# Patient Record
Sex: FEMALE | Race: BLACK OR AFRICAN AMERICAN | Employment: UNEMPLOYED | URBAN - METROPOLITAN AREA
[De-identification: names, ages, dates, MRNs, and addresses within clinical notes are randomized per-mention and may not be internally consistent; named-entity substitution may affect disease eponyms.]

---

## 2017-04-08 ENCOUNTER — HOSPITAL ENCOUNTER (OUTPATIENT)
Dept: MRI IMAGING | Age: 47
Discharge: HOME | End: 2017-04-08
Payer: COMMERCIAL

## 2017-04-08 DIAGNOSIS — M25.561 RIGHT KNEE PAIN, UNSPECIFIED CHRONICITY: ICD-10-CM

## 2017-04-08 PROCEDURE — 73721 MRI JNT OF LWR EXTRE W/O DYE: CPT

## 2018-02-16 ENCOUNTER — HOSPITAL ENCOUNTER (OUTPATIENT)
Dept: MAMMOGRAPHY | Age: 48
Discharge: HOME | End: 2018-02-18
Payer: COMMERCIAL

## 2018-02-16 DIAGNOSIS — Z12.31 VISIT FOR SCREENING MAMMOGRAM: ICD-10-CM

## 2018-02-16 PROCEDURE — 77067 SCR MAMMO BI INCL CAD: CPT

## 2018-03-01 ENCOUNTER — HOSPITAL ENCOUNTER (OUTPATIENT)
Dept: MAMMOGRAPHY | Age: 48
Discharge: HOME | End: 2018-03-03
Payer: COMMERCIAL

## 2018-03-01 DIAGNOSIS — Z01.419 VISIT FOR GYNECOLOGIC EXAMINATION: ICD-10-CM

## 2018-03-01 PROCEDURE — 77080 DXA BONE DENSITY AXIAL: CPT

## 2019-02-21 ENCOUNTER — HOSPITAL ENCOUNTER (OUTPATIENT)
Dept: MAMMOGRAPHY | Age: 49
Discharge: HOME | End: 2019-02-23
Payer: COMMERCIAL

## 2019-02-21 DIAGNOSIS — Z12.39 SCREENING FOR BREAST CANCER: ICD-10-CM

## 2019-02-21 PROCEDURE — 77067 SCR MAMMO BI INCL CAD: CPT

## 2019-02-22 LAB
ABSOLUTE IMMATURE GRANULOCYTE: 0 10*3/UL (ref 0–0.2)
ALBUMIN: 4.1 G/DL (ref 3.5–5.7)
ALP BLD-CCNC: 98 IU/L (ref 34–104)
ALT SERPL-CCNC: 15 IU/L (ref 7–52)
AST SERPL-CCNC: 20 IU/L (ref 13–39)
BASOPHILS ABSOLUTE: 0 10*3/UL (ref 0–0.2)
BASOPHILS RELATIVE PERCENT: 0 % (ref 0–1)
BILIRUB SERPL-MCNC: 0.3 MG/DL (ref 0.3–1)
BUN BLDV-MCNC: 19 MG/DL (ref 7–25)
CALCIUM SERPL-MCNC: 9.6 MG/DL (ref 8.6–10.3)
CHLORIDE BLD-SCNC: 104 MEQ/L (ref 98–107)
CO2: 27 MEQ/L (ref 21–31)
CREAT SERPL-MCNC: 0.95 MG/DL (ref 0.6–1.2)
EGFR AFRICAN AMERICAN: >60 ML/MIN/1.73SQ M
EGFR IF NONAFRICAN AMERICAN: >60 ML/MIN/1.73SQ M
EOSINOPHILS ABSOLUTE: 0 10*3/UL (ref 0–0.5)
EOSINOPHILS RELATIVE PERCENT: 1.2 % (ref 0–6)
GLUCOSE: 99 MG/DL (ref 70–100)
HCT VFR BLD CALC: 32.9 % (ref 36–45)
HEMOGLOBIN: 10.6 G/DL (ref 12–15)
IMMATURE GRANULOCYTES: 0 % (ref 0–1)
LYMPHOCYTES ABSOLUTE: 0.6 10*3/UL (ref 1.2–4)
LYMPHOCYTES RELATIVE PERCENT: 23.1 % (ref 20–45)
MCH RBC QN AUTO: 33.8 PG (ref 27–33)
MCHC RBC AUTO-ENTMCNC: 32.2 G/DL (ref 32–35)
MCV RBC AUTO: 104.8 FL (ref 82–98)
MONOCYTES ABSOLUTE: 0.4 10*3/UL (ref 0.1–1)
MONOCYTES RELATIVE PERCENT: 14.2 % (ref 5–12)
NEUTROPHILS ABSOLUTE: 1.6 10*3/UL (ref 1.6–7.6)
NEUTROPHILS RELATIVE PERCENT: 61.5 % (ref 40–72)
NUCLEATED RED BLOOD CELLS: 0 % (ref 0–0)
PDW BLD-RTO: 13.7 % (ref 11.5–15)
PLATELET # BLD: 223 10*3/UL (ref 150–400)
POTASSIUM SERPL-SCNC: 4.6 MEQ/L (ref 3.5–5.1)
PROTEIN TOTAL: 7.9 G/DL (ref 6–8.3)
RBC: 3.14 10*6/UL (ref 3.8–5)
SODIUM BLD-SCNC: 138 MEQ/L (ref 136–145)
WBC: 2.6 10*3/UL (ref 4–10.6)

## 2019-02-26 LAB
+IMM: ABNORMAL
IGA: 99 MG/DL (ref 60–413)
IGG: 1590 MG/DL (ref 591–1540)
IGM: 52 MG/DL (ref 54–285)
KAPPA FREE LIGHT CHAINS QNT: 1.41 MG/DL (ref 0.33–1.94)
KAPPA/LAMBDA RATIO: 0.39 RATIO (ref 0.26–1.65)
LAMBDA: 3.61 MG/DL (ref 0.57–2.63)

## 2019-03-27 ENCOUNTER — HOSPITAL ENCOUNTER (OUTPATIENT)
Dept: ULTRASOUND IMAGING | Age: 49
Discharge: HOME | End: 2019-03-29
Payer: COMMERCIAL

## 2019-03-27 DIAGNOSIS — N92.1 EXCESSIVE AND FREQUENT MENSTRUATION WITH IRREGULAR CYCLE: ICD-10-CM

## 2019-03-27 PROCEDURE — 76830 TRANSVAGINAL US NON-OB: CPT

## 2019-04-04 ENCOUNTER — APPOINTMENT (OUTPATIENT)
Dept: GENERAL RADIOLOGY | Age: 49
End: 2019-04-04
Payer: COMMERCIAL

## 2019-04-04 ENCOUNTER — HOSPITAL ENCOUNTER (EMERGENCY)
Age: 49
Discharge: HOME | End: 2019-04-04
Attending: EMERGENCY MEDICINE
Payer: COMMERCIAL

## 2019-04-04 ENCOUNTER — HOSPITAL ENCOUNTER (OUTPATIENT)
Dept: CT IMAGING | Age: 49
Discharge: HOME | End: 2019-04-06
Payer: COMMERCIAL

## 2019-04-04 VITALS
RESPIRATION RATE: 19 BRPM | HEIGHT: 72 IN | OXYGEN SATURATION: 100 % | TEMPERATURE: 98.4 F | HEART RATE: 65 BPM | SYSTOLIC BLOOD PRESSURE: 115 MMHG | BODY MASS INDEX: 39.68 KG/M2 | WEIGHT: 293 LBS | DIASTOLIC BLOOD PRESSURE: 66 MMHG

## 2019-04-04 VITALS
TEMPERATURE: 98.4 F | OXYGEN SATURATION: 99 % | RESPIRATION RATE: 16 BRPM | HEIGHT: 72 IN | SYSTOLIC BLOOD PRESSURE: 151 MMHG | BODY MASS INDEX: 39.68 KG/M2 | WEIGHT: 293 LBS | HEART RATE: 71 BPM | DIASTOLIC BLOOD PRESSURE: 85 MMHG

## 2019-04-04 DIAGNOSIS — R07.9 CHEST PAIN, UNSPECIFIED TYPE: Primary | ICD-10-CM

## 2019-04-04 LAB
ABSOLUTE EOS #: 0 K/UL (ref 0–0.4)
ABSOLUTE IMMATURE GRANULOCYTE: ABNORMAL K/UL (ref 0–0.3)
ABSOLUTE LYMPH #: 0.8 K/UL (ref 1–4.8)
ABSOLUTE MONO #: 0.3 K/UL (ref 0.2–0.8)
ABSOLUTE RETIC #: 0.04 M/UL (ref 0.02–0.1)
ANION GAP SERPL CALCULATED.3IONS-SCNC: 12 MMOL/L (ref 9–17)
BASOPHILS # BLD: 1 % (ref 0–2)
BASOPHILS ABSOLUTE: 0 K/UL (ref 0–0.2)
BUN BLDV-MCNC: 16 MG/DL (ref 6–20)
BUN/CREAT BLD: 18 (ref 9–20)
CALCIUM SERPL-MCNC: 9.6 MG/DL (ref 8.6–10.4)
CHLORIDE BLD-SCNC: 102 MMOL/L (ref 98–107)
CO2: 24 MMOL/L (ref 20–31)
CREAT SERPL-MCNC: 0.9 MG/DL (ref 0.5–0.9)
DIFFERENTIAL TYPE: ABNORMAL
EOSINOPHILS RELATIVE PERCENT: 1 % (ref 1–4)
GFR AFRICAN AMERICAN: >60 ML/MIN
GFR NON-AFRICAN AMERICAN: >60 ML/MIN
GFR SERPL CREATININE-BSD FRML MDRD: NORMAL ML/MIN/{1.73_M2}
GFR SERPL CREATININE-BSD FRML MDRD: NORMAL ML/MIN/{1.73_M2}
GLUCOSE BLD-MCNC: 95 MG/DL (ref 70–99)
HCT VFR BLD CALC: 33 % (ref 36–46)
HEMOGLOBIN: 11.2 G/DL (ref 12–16)
IMMATURE GRANULOCYTES: ABNORMAL %
IMMATURE RETIC FRACT: NORMAL %
INR BLD: 0.9
LYMPHOCYTES # BLD: 35 % (ref 24–44)
MCH RBC QN AUTO: 33.8 PG (ref 26–34)
MCHC RBC AUTO-ENTMCNC: 33.9 G/DL (ref 31–37)
MCV RBC AUTO: 99.6 FL (ref 80–100)
MONOCYTES # BLD: 11 % (ref 1–7)
NRBC AUTOMATED: ABNORMAL PER 100 WBC
PARTIAL THROMBOPLASTIN TIME: 20.9 SEC (ref 23–31)
PDW BLD-RTO: 14.8 % (ref 11.5–14.5)
PLATELET # BLD: 227 K/UL (ref 130–400)
PLATELET ESTIMATE: ABNORMAL
PMV BLD AUTO: 6.8 FL (ref 6–12)
POTASSIUM SERPL-SCNC: 4.7 MMOL/L (ref 3.7–5.3)
PROTHROMBIN TIME: 9.6 SEC (ref 9.7–11.6)
RBC # BLD: 3.31 M/UL (ref 4–5.2)
RBC # BLD: ABNORMAL 10*6/UL
RETIC %: 1.3 % (ref 0.5–2)
RETIC HEMOGLOBIN: NORMAL PG (ref 28.2–35.7)
SEG NEUTROPHILS: 52 % (ref 36–66)
SEGMENTED NEUTROPHILS ABSOLUTE COUNT: 1.2 K/UL (ref 1.8–7.7)
SODIUM BLD-SCNC: 138 MMOL/L (ref 135–144)
TROPONIN INTERP: NORMAL
TROPONIN INTERP: NORMAL
TROPONIN T: NORMAL NG/ML
TROPONIN T: NORMAL NG/ML
TROPONIN, HIGH SENSITIVITY: 6 NG/L (ref 0–14)
TROPONIN, HIGH SENSITIVITY: 7 NG/L (ref 0–14)
WBC # BLD: 2.4 K/UL (ref 3.5–11)
WBC # BLD: ABNORMAL 10*3/UL

## 2019-04-04 PROCEDURE — 99285 EMERGENCY DEPT VISIT HI MDM: CPT

## 2019-04-04 PROCEDURE — 85025 COMPLETE CBC W/AUTO DIFF WBC: CPT

## 2019-04-04 PROCEDURE — 6370000000 HC RX 637 (ALT 250 FOR IP): Performed by: EMERGENCY MEDICINE

## 2019-04-04 PROCEDURE — 36415 COLL VENOUS BLD VENIPUNCTURE: CPT

## 2019-04-04 PROCEDURE — 85730 THROMBOPLASTIN TIME PARTIAL: CPT

## 2019-04-04 PROCEDURE — 2580000003 HC RX 258: Performed by: RADIOLOGY

## 2019-04-04 PROCEDURE — 80048 BASIC METABOLIC PNL TOTAL CA: CPT

## 2019-04-04 PROCEDURE — 85610 PROTHROMBIN TIME: CPT

## 2019-04-04 PROCEDURE — 84484 ASSAY OF TROPONIN QUANT: CPT

## 2019-04-04 PROCEDURE — 71045 X-RAY EXAM CHEST 1 VIEW: CPT

## 2019-04-04 PROCEDURE — 93005 ELECTROCARDIOGRAM TRACING: CPT

## 2019-04-04 PROCEDURE — 85045 AUTOMATED RETICULOCYTE COUNT: CPT

## 2019-04-04 RX ORDER — SPIRONOLACTONE 100 MG/1
200 TABLET, FILM COATED ORAL EVERY MORNING
COMMUNITY

## 2019-04-04 RX ORDER — SPIRONOLACTONE 100 MG/1
100 TABLET, FILM COATED ORAL DAILY
COMMUNITY
Start: 2019-03-06 | End: 2019-04-04

## 2019-04-04 RX ORDER — ARIPIPRAZOLE 15 MG/1
15 TABLET ORAL DAILY
COMMUNITY
Start: 2019-03-02

## 2019-04-04 RX ORDER — SODIUM CHLORIDE 9 MG/ML
INJECTION, SOLUTION INTRAVENOUS CONTINUOUS
Status: DISCONTINUED | OUTPATIENT
Start: 2019-04-04 | End: 2019-04-07 | Stop reason: HOSPADM

## 2019-04-04 RX ORDER — FLUTICASONE PROPIONATE 50 MCG
1 SPRAY, SUSPENSION (ML) NASAL DAILY PRN
COMMUNITY

## 2019-04-04 RX ORDER — ASPIRIN 81 MG/1
324 TABLET, CHEWABLE ORAL ONCE
Status: COMPLETED | OUTPATIENT
Start: 2019-04-04 | End: 2019-04-04

## 2019-04-04 RX ORDER — OMEPRAZOLE 20 MG/1
20 CAPSULE, DELAYED RELEASE ORAL DAILY
COMMUNITY
Start: 2019-04-02

## 2019-04-04 RX ORDER — SODIUM CHLORIDE 0.9 % (FLUSH) 0.9 %
10 SYRINGE (ML) INJECTION PRN
Status: DISCONTINUED | OUTPATIENT
Start: 2019-04-04 | End: 2019-04-07 | Stop reason: HOSPADM

## 2019-04-04 RX ORDER — METOPROLOL SUCCINATE 100 MG/1
100 TABLET, EXTENDED RELEASE ORAL DAILY
COMMUNITY
Start: 2019-03-04

## 2019-04-04 RX ADMIN — SODIUM CHLORIDE: 9 INJECTION, SOLUTION INTRAVENOUS at 09:47

## 2019-04-04 RX ADMIN — ASPIRIN 81 MG 324 MG: 81 TABLET ORAL at 10:51

## 2019-04-04 ASSESSMENT — PAIN DESCRIPTION - ONSET: ONSET: ON-GOING

## 2019-04-04 ASSESSMENT — ENCOUNTER SYMPTOMS
BACK PAIN: 0
EYE DISCHARGE: 0
FACIAL SWELLING: 0
CHEST TIGHTNESS: 0
SHORTNESS OF BREATH: 0
ABDOMINAL DISTENTION: 0
EYE PAIN: 0
ABDOMINAL PAIN: 0

## 2019-04-04 ASSESSMENT — PAIN - FUNCTIONAL ASSESSMENT: PAIN_FUNCTIONAL_ASSESSMENT: 0-10

## 2019-04-04 ASSESSMENT — PAIN DESCRIPTION - PAIN TYPE: TYPE: ACUTE PAIN

## 2019-04-04 ASSESSMENT — PAIN DESCRIPTION - DESCRIPTORS
DESCRIPTORS: ACHING
DESCRIPTORS: PRESSURE

## 2019-04-04 ASSESSMENT — PAIN DESCRIPTION - LOCATION: LOCATION: CHEST

## 2019-04-04 ASSESSMENT — PAIN SCALES - GENERAL: PAINLEVEL_OUTOF10: 4

## 2019-04-04 ASSESSMENT — HEART SCORE: ECG: 0

## 2019-04-04 ASSESSMENT — PAIN DESCRIPTION - ORIENTATION: ORIENTATION: LEFT;ANTERIOR

## 2019-04-04 ASSESSMENT — PAIN DESCRIPTION - FREQUENCY: FREQUENCY: CONTINUOUS

## 2019-04-04 ASSESSMENT — PAIN DESCRIPTION - PROGRESSION: CLINICAL_PROGRESSION: NOT CHANGED

## 2019-04-04 NOTE — PROGRESS NOTES
Transitions of Care Pharmacy Service   Medication Review    The patient's list of current home medications has been reviewed. Source(s) of information: Patient/SS    Based on information provided by the above source(s), I have updated the patient's home med list as described below. Please review the ACTION REQUESTED BY PHYSICIAN section of this note for any discrepancies on current hospital orders. I changed or updated the following medications on the patient's home medication list:  Discontinued Cyanocobalamin (B-12) 1000 MCG SUBL (Pt stated she has not been taking. Last time she took this was months ago)     Added Fluticasone 50 mcg (on sure scripts, patient says she uses it PRN)     Adjusted   Cholecalciferol (Vitamin D) 2000 units to vitamin D3 5000 units daily per patient  Metformin 500 mg sig was adjusted to metformin 500 mg BID  Spironolactone 100 mg daily to spironolactone 200 mg daily in the morning per patient   Other Notes Allergies and reactions        PCNS - Headaches        Beta blockers - Patient says she does not know the reaction  but that family doctor listed it        Bactrim - Reaction unknown        Lisinopril - Facial swelling     Please feel free to call me with any questions about this encounter. Thank you. This note will be reviewed and co-signed by the Transitions of Care Pharmacist.    Yumiko Jeff, PharmD student  Transitions of Care Pharmacy Service  Phone:  [phone]  Fax: [phone]      Electronically signed by Yumiko Jeff on 4/4/2019 at 1:08 PM     Prior to Admission medications    Medication Sig Start Date End Date Taking?  Authorizing Provider   metFORMIN (GLUCOPHAGE) 500 MG tablet 500 mg 2 times daily (with meals)  3/6/19  Yes Historical Provider, MD   metoprolol succinate (TOPROL XL) 100 MG extended release tablet 100 mg daily  3/4/19  Yes Historical Provider, MD   ARIPiprazole (ABILIFY) 15 MG tablet 15 mg daily  3/2/19  Yes Historical Provider, MD omeprazole (PRILOSEC) 20 MG delayed release capsule 20 mg Daily  4/2/19  Yes Historical Provider, MD   spironolactone (ALDACTONE) 100 MG tablet Take 200 mg by mouth daily Indications: In the morning   Yes Historical Provider, MD   Cholecalciferol (VITAMIN D3) 5000 units TABS Take 1 tablet by mouth daily   Yes Historical Provider, MD   fluticasone (FLONASE) 50 MCG/ACT nasal spray 1 spray by Each Nare route daily as needed for Rhinitis   Yes Historical Provider, MD   Multiple Vitamins-Minerals (THERAPEUTIC MULTIVITAMIN-MINERALS) tablet Take 1 tablet by mouth daily. Yes Historical Provider, MD   Glucosamine-Chondroit-Vit C-Mn (GLUCOSAMINE CHONDR 1500 COMPLX) CAPS Take 1,500 mg by mouth daily    Yes Historical Provider, MD   albuterol (PROVENTIL HFA;VENTOLIN HFA) 108 (90 BASE) MCG/ACT inhaler Inhale 2 puffs into the lungs every 6 hours as needed for Wheezing.    Yes Historical Provider, MD

## 2019-04-04 NOTE — ED PROVIDER NOTES
EMERGENCY DEPARTMENT ENCOUNTER    Pt Name: No Mcneil  MRN: [id number]  [place] 1970  Date of evaluation: 19  CHIEF COMPLAINT       Chief Complaint   Patient presents with    Chest Pain     HISTORY OF PRESENT ILLNESS   HPI   Patient is a 74-year-old female with a history of hypertension, prediabetes who presented to the emergency department secondary to chest pain. Patient was scheduled to undergo an outpatient bone marrow biopsy when she complained of chest pain. Patient stated chest pain began yesterday while at rest just been constant since then she describes as a pressure sensation 4-10 on the pain score with radiation to her left shoulder. Patient denied any trauma or injury. She has not presented to the emerge from for care secondary to being stubborn. Patient has a history of anemia she was previously her menstrual cycle for 5 weeks underwent a pelvic ultrasound she has not followed up on the results. She denied hematuria or rectal bleeding or feelings that she was going to faint. Patient has a positive family history of cardiac disease father  of a heart attack later in life. She denied tobacco use. She had a stress test several years ago that was normal per her done for routine checkup. She's never had a heart cath. Patient had recent travel, surgery, and mobility. No family history of clotting disorder. REVIEW OF SYSTEMS     Review of Systems   Constitutional: Negative for chills, diaphoresis and fever. HENT: Negative for congestion, ear pain and facial swelling. Eyes: Negative for pain, discharge and visual disturbance. Respiratory: Negative for chest tightness and shortness of breath. Cardiovascular: Positive for chest pain. Negative for palpitations. Gastrointestinal: Negative for abdominal distention and abdominal pain. Genitourinary: Negative for difficulty urinating and flank pain. Musculoskeletal: Negative for back pain. Skin: Negative for wound. Neurological: Negative for dizziness, light-headedness and headaches. 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[facility]) 2003    Hiatal hernia     Leukopenia     Lupus     Monoclonal gammopathy     MVP (mitral valve prolapse)     Plantar fasciitis of left foot     Reye's syndrome     Seasonal allergic conjunctivitis     not sure what allergic to     SURGICAL HISTORY       Past Surgical History:   Procedure Laterality Date    FINGER SURGERY      left ring finger    FOOT SURGERY Left     KNEE ARTHROPLASTY Left     SHOULDER SURGERY Left     TOE SURGERY      right great    WISDOM TOOTH EXTRACTION       CURRENT MEDICATIONS       Discharge Medication List as of 4/4/2019  1:41 PM      CONTINUE these medications which have NOT CHANGED    Details   metFORMIN (GLUCOPHAGE) 500 MG tablet 500 mg 2 times daily (with meals) Historical Med      metoprolol succinate (TOPROL XL) 100 MG extended release tablet 100 mg daily Historical Med      ARIPiprazole (ABILIFY) 15 MG tablet 15 mg daily Historical Med      omeprazole (PRILOSEC) 20 MG delayed release capsule 20 mg Daily Historical Med      Multiple Vitamins-Minerals (THERAPEUTIC MULTIVITAMIN-MINERALS) tablet Take 1 tablet by mouth daily. Glucosamine-Chondroit-Vit C-Mn (GLUCOSAMINE CHONDR 1500 COMPLX) CAPS Take 1,500 mg by mouth daily Historical Med      spironolactone (ALDACTONE) 100 MG tablet Take 200 mg by mouth daily Indications: In the morningHistorical Med      Cholecalciferol (VITAMIN D3) 5000 units TABS Take 1 tablet by mouth dailyHistorical Med      fluticasone (FLONASE) 50 MCG/ACT nasal spray 1 spray by Each Nare route daily as needed for RhinitisHistorical Med      albuterol (PROVENTIL HFA;VENTOLIN HFA) 108 (90 BASE) MCG/ACT inhaler Inhale 2 puffs into the lungs every 6 hours as needed for Wheezing.            ALLERGIES is allergic to pcn [penicillins]; beta adrenergic blockers; lisinopril; and sulfamethoxazole-trimethoprim. FAMILY HISTORY     has no family status information on file. SOCIAL HISTORY       Social History     Tobacco Use    Smoking status: Never Smoker    Smokeless tobacco: Never Used   Substance Use Topics    Alcohol use: No    Drug use: No     PHYSICAL EXAM     INITIAL VITALS: /66   Pulse 65   Temp 98.4 °F (36.9 °C) (Oral)   Resp 19   Ht 6' (1.829 m)   Wt (!) 327 lb (148.3 kg)   LMP 08/19/2013   SpO2 100%   BMI 44.35 kg/m²    Physical Exam   Constitutional: She is oriented to person, place, and time. She appears well-developed and well-nourished. HENT:   Head: Normocephalic and atraumatic. Eyes: Pupils are equal, round, and reactive to light. EOM are normal.   Neck: Normal range of motion. Neck supple. Cardiovascular: Normal rate and regular rhythm. Pulmonary/Chest: Effort normal and breath sounds normal.   Abdominal: Soft. Bowel sounds are normal.   Musculoskeletal: Normal range of motion. Neurological: She is alert and oriented to person, place, and time. Nursing note and vitals reviewed. MEDICAL DECISION MAKING:   The patient was seen and examined. The patient's a 57-year-old female presented to the emergency department secondary to chest pain. Differential Diagnosis included but not limited to STEMI, pericarditis, anemia. EKG obtained sinus rhythm no acute ST wave depression elevation or findings consistent with STEMI. Patient received aspirin she was chest pain-free. Chest x-ray no infiltrate dissection or pneumothorax. Troponin ×2 negative. Patient discussed with Dr. Mckenzie [place], cardiology,  who agreed the patient can be discharged home with outpatient follow-up. Patient was amenable to this treatment plan. Discharged home with outpatient follow-up.          CRITICAL CARE:              NIH STROKE SCALE:            PROCEDURES:    Procedures    DIAGNOSTIC PM        Modesta Hernandez MD  Attending Emergency Physician                    Modesta Hernandez MD  93/46/05 2263

## 2019-04-04 NOTE — ED NOTES
Pt connected to cardiac monitor and 02 @ 2L per NC applied at this time.       Juan Rebolledo RN  04/04/19 [id number]

## 2019-04-04 NOTE — ED NOTES
Pt to room 24 via stretcher from Pre-op with c/o ongoing left sided CP, onset yesterday. Pt reports she was scheduled to have a bone marrow biopsy today, states \"My red blood cells are enlarged and I'm anemic\" and \"I was having a lot of heavy vaginal bleeding so that's why they're doing the biopsy\". Pt denies having any vaginal bleeding at this time. Pt reports CP does not radiate anywhere. Pt denies any aggravating or alleviating factors. Pt denies any dizziness, SOB, N/V, or sweats. Respirations even and non labored. Skin PWD, MMM. NSR on monitor without ectopy. NAD noted.       Gonzales Gibson RN  04/04/19 [id number]

## 2019-04-04 NOTE — PROGRESS NOTES
Pt c/o midsternum chest pain of a \"4\", Special procedures notified and pt taken to ER for evaluation

## 2019-04-04 NOTE — H&P (VIEW-ONLY)
[facility] Physical [facility]   [facility]    HISTORY AND PHYSICAL EXAMINATION            Date of Evaluation: 4/4/2019  Patient name:  Jenna Riley  MRN:   [id number]  YOB: 1970  PCP:    Chanel Gonsalves MD    History Obtained From:     Patient, medical records    History of Present Illness: This is Jenna Riley a 51 y.o. female who presents today for a bone marrow biopsy by Dr. Kimo Flores. The patient has a history of monoclonal gammopathy as well as anemia. Her labs  on 2/22/19 showed Hgb level 10.6, WBC 2.60 and platelets 952. Her IgG was 1590 and Lambda 3.61. She also has a history of Lupus as well as Guillaiin-[place] diagnosed in 2003. She currently is complaining of new onset midsternal chest pain pressure like pain that is a constant 4/10 for the past 24 hours. She states she does have MVP but has never had chest pain before in the past.  She denies any other cardiac associated symptoms such as radiation, dyspnea, palpitations, light headedness or diaphoresis. Interventional radiology called regarding this problem and told to have patient go to the ED. Patient notified and she verbalizes understanding and is agreeable to go to the ED.       Past Medical History:     Past Medical History:   Diagnosis Date    Anemia     Asthma     Bone spur     feet    Bronchitis     Guillain-[place] ([address] 2003    Guillain-[place] ([facility] [address] 2003    Hiatal hernia     Leukopenia     Lupus (HCC)     Monoclonal gammopathy     MVP (mitral valve prolapse)     Plantar fasciitis of left foot     Reye's syndrome     Seasonal allergic conjunctivitis     not sure what allergic to        Past Surgical History:     Past Surgical History:   Procedure Laterality Date    FINGER SURGERY      left ring finger    KNEE ARTHROPLASTY Left     SHOULDER SURGERY Left     TOE SURGERY      right great    WISDOM TOOTH EXTRACTION          Medications Prior to Admission:     Prior to Admission medications    Medication Sig Start Date End Date Taking? Authorizing Provider   Cyanocobalamin (B-12) 1000 MCG SUBL Place 1 tablet under the tongue daily 7/9/15   Lit Meza MD   oxyCODONE-acetaminophen [facility]) 5-325 MG TABS Take 1-2 tablets by mouth every 6 hours as needed 7/9/15   Lit Meza MD   famotidine (PEPCID) 40 MG TABS Take 1 tablet by mouth daily 7/9/15   Lit Meza MD   Multiple Vitamins-Minerals (THERAPEUTIC MULTIVITAMIN-MINERALS) tablet Take 1 tablet by mouth daily. Historical Provider, MD   Cholecalciferol (VITAMIN D) 2000 UNITS CAPS capsule Take 2,000 Units by mouth. Historical Provider, MD   Glucosamine-Chondroit-Vit C-Mn (GLUCOSAMINE CHONDR 1500 COMPLX) CAPS Take 1,500 mg by mouth. Historical Provider, MD   albuterol (PROVENTIL HFA;VENTOLIN HFA) 108 (90 BASE) MCG/ACT inhaler Inhale 2 puffs into the lungs every 6 hours as needed for Wheezing. Historical Provider, MD        Allergies:     Pcn [penicillins]; Lisinopril; and Sulfamethoxazole-trimethoprim    Social History:     Tobacco:    reports that she has never smoked. She does not have any smokeless tobacco history on file. Alcohol:      reports that she does not drink alcohol. Drug Use:  reports that she does not use drugs. Family History:     No family history on file. Review of Systems:     Positive and Negative as described in HPI. CONSTITUTIONAL:  negative for fevers, chills, sweats, fatigue, weight loss  HEENT:  negative for vision, hearing changes, runny nose, throat pain  RESPIRATORY:  negative for shortness of breath, cough, congestion, wheezing. CARDIOVASCULAR: New onset mid sternal chest pain pressure like pain that is a constant 4/10 for the past 24 hours. She states she does have MVP but has never had chest pain before in the past.  She denies any other cardiac associated symptoms such as radiation, dyspnea, palpitations, light headedness or diaphoresis.   GASTROINTESTINAL: negative for nausea, vomiting, diarrhea, constipation, change in bowel habits, abdominal pain   GENITOURINARY:  negative for difficulty of urination, burning with urination, frequency   INTEGUMENT:  negative for rash, skin lesions, easy bruising   HEMATOLOGIC/LYMPHATIC:  negative for swelling/edema   ALLERGIC/IMMUNOLOGIC:  negative for urticaria , itching  ENDOCRINE:  negative increase in drinking, increase in urination, hot or cold intolerance  MUSCULOSKELETAL:  negative joint pains, muscle aches, swelling of joints  NEUROLOGICAL:  negative for headaches, dizziness, lightheadedness, numbness, pain, tingling extremities  BEHAVIOR/PSYCH:  negative for depression, anxiety    Physical Exam:   BP (!) 151/85   Pulse 71   Temp 98.4 °F (36.9 °C) (Oral)   Resp 16   LMP 08/19/2013   SpO2 99%   Patient's last menstrual period was 08/19/2013. General Appearance:  alert, well appearing, and in no acute distress  Mental status: oriented to person, place, and time with normal affect  Head:  normocephalic, atraumatic. Eye: no icterus, redness, pupils equal and reactive, extraocular eye movements intact, conjunctiva clear  Ear: normal external ear, no discharge, hearing intact  Nose:  no drainage noted  Mouth: mucous membranes moist  Neck: supple, no carotid bruits, thyroid not palpable  Lungs: Bilateral equal air entry, clear to ausculation, no wheezing, rales or rhonchi, normal effort  Cardiovascular: normal rate, regular rhythm, no murmur audible at this time (does have a history of MVP), gallop, rub.   Abdomen: Soft, nontender, nondistended, normal bowel sounds, no hepatomegaly or splenomegaly  Neurologic: There are no new focal motor or sensory deficits, normal muscle tone and bulk, no abnormal sensation, normal speech, cranial nerves II through XII grossly intact  Skin: No gross lesions, rashes, bruising or bleeding on exposed skin area  Extremities:  peripheral pulses palpable, no pedal edema or calf pain with palpation  Psych: normal affect         Diagnosis:      Monoclonal gammopathy    SHELLI Leach CNP  4/4/2019  9:09 AM

## 2019-04-05 LAB
EKG ATRIAL RATE: 69 BPM
EKG P AXIS: 37 DEGREES
EKG P-R INTERVAL: 184 MS
EKG Q-T INTERVAL: 404 MS
EKG QRS DURATION: 96 MS
EKG QTC CALCULATION (BAZETT): 432 MS
EKG R AXIS: 33 DEGREES
EKG T AXIS: 29 DEGREES
EKG VENTRICULAR RATE: 69 BPM

## 2019-04-23 RX ORDER — SODIUM CHLORIDE 9 MG/ML
INJECTION, SOLUTION INTRAVENOUS CONTINUOUS
Status: CANCELLED | OUTPATIENT
Start: 2019-04-23

## 2019-04-24 ENCOUNTER — HOSPITAL ENCOUNTER (OUTPATIENT)
Dept: CT IMAGING | Age: 49
Discharge: HOME | End: 2019-04-26
Payer: COMMERCIAL

## 2019-04-24 VITALS
HEIGHT: 72 IN | WEIGHT: 293 LBS | HEART RATE: 82 BPM | RESPIRATION RATE: 17 BRPM | OXYGEN SATURATION: 99 % | TEMPERATURE: 97.8 F | BODY MASS INDEX: 39.68 KG/M2 | SYSTOLIC BLOOD PRESSURE: 127 MMHG | DIASTOLIC BLOOD PRESSURE: 75 MMHG

## 2019-04-24 DIAGNOSIS — D47.2 MONOCLONAL GAMMOPATHY: ICD-10-CM

## 2019-04-24 LAB
INR BLD: 0.9
PARTIAL THROMBOPLASTIN TIME: 21.5 SEC (ref 20.5–30.5)
PLATELET # BLD: 189 K/UL (ref 138–453)
PROTHROMBIN TIME: 9.6 SEC (ref 9–12)

## 2019-04-24 PROCEDURE — 88185 FLOWCYTOMETRY/TC ADD-ON: CPT

## 2019-04-24 PROCEDURE — 88280 CHROMOSOME KARYOTYPE STUDY: CPT

## 2019-04-24 PROCEDURE — 85049 AUTOMATED PLATELET COUNT: CPT

## 2019-04-24 PROCEDURE — C1830 POWER BONE MARROW BX NEEDLE: HCPCS

## 2019-04-24 PROCEDURE — 7100000011 HC PHASE II RECOVERY - ADDTL 15 MIN

## 2019-04-24 PROCEDURE — 88271 CYTOGENETICS DNA PROBE: CPT

## 2019-04-24 PROCEDURE — 88237 TISSUE CULTURE BONE MARROW: CPT

## 2019-04-24 PROCEDURE — 76937 US GUIDE VASCULAR ACCESS: CPT

## 2019-04-24 PROCEDURE — 2580000003 HC RX 258: Performed by: PHYSICIAN ASSISTANT

## 2019-04-24 PROCEDURE — 88313 SPECIAL STAINS GROUP 2: CPT

## 2019-04-24 PROCEDURE — 88275 CYTOGENETICS 100-300: CPT

## 2019-04-24 PROCEDURE — 88184 FLOWCYTOMETRY/ TC 1 MARKER: CPT

## 2019-04-24 PROCEDURE — 85730 THROMBOPLASTIN TIME PARTIAL: CPT

## 2019-04-24 PROCEDURE — 88305 TISSUE EXAM BY PATHOLOGIST: CPT

## 2019-04-24 PROCEDURE — 88264 CHROMOSOME ANALYSIS 20-25: CPT

## 2019-04-24 PROCEDURE — 38221 DX BONE MARROW BIOPSIES: CPT

## 2019-04-24 PROCEDURE — 7100000010 HC PHASE II RECOVERY - FIRST 15 MIN

## 2019-04-24 PROCEDURE — 88311 DECALCIFY TISSUE: CPT

## 2019-04-24 PROCEDURE — 85610 PROTHROMBIN TIME: CPT

## 2019-04-24 PROCEDURE — 88360 TUMOR IMMUNOHISTOCHEM/MANUAL: CPT

## 2019-04-24 RX ORDER — SODIUM CHLORIDE 9 MG/ML
INJECTION, SOLUTION INTRAVENOUS CONTINUOUS
Status: DISCONTINUED | OUTPATIENT
Start: 2019-04-24 | End: 2019-04-27 | Stop reason: HOSPADM

## 2019-04-24 RX ADMIN — SODIUM CHLORIDE: 9 INJECTION, SOLUTION INTRAVENOUS at 10:42

## 2019-04-24 ASSESSMENT — PAIN SCALES - GENERAL
PAINLEVEL_OUTOF10: 0
PAINLEVEL_OUTOF10: 4
PAINLEVEL_OUTOF10: 0
PAINLEVEL_OUTOF10: 0
PAINLEVEL_OUTOF10: 5
PAINLEVEL_OUTOF10: 0

## 2019-04-24 ASSESSMENT — PAIN - FUNCTIONAL ASSESSMENT
PAIN_FUNCTIONAL_ASSESSMENT: 0-10
PAIN_FUNCTIONAL_ASSESSMENT: 0-10

## 2019-04-24 NOTE — INTERVAL H&P NOTE
Pt Name: Palmer Choi  MRN: [id number]  YOB: 1970  Date of evaluation: 4/24/2019    I have reviewed the patient's history and physical examination completed pre-procedure 4/4/19. Changes to history or on examination, if any, are as follows:  No exam changes. Patient was scheduled for this procedure at [facility] on 4/4/19. She experienced chest pain at the time, so was sent to the ER. She has since then had a stress test at [facility].     Carolina Joe PA-C  4/24/19  9:58 AM

## 2019-04-24 NOTE — BRIEF OP NOTE
Brief Postoperative Note for Bone Marrow Biopsy    Shayy Jordan  YOB: 1970  [id number]    Pre-operative Diagnosis: monoclonal gammopathy     Post-operative Diagnosis: Same     Procedure: Left iliac bone marrow aspiration with and without heparin & biopsy     Anesthesia: 1% lidocaine     Surgeons/Assistants: Dr. Karen Rich     Estimated Blood Loss: less than 50      Complications: None     Specimens: were Obtained     Findings: Biopsy performed on the Left iliac bone using an 11 g Arrow Oncontrol Device. Bone marrow aspirate was collected with and without heparin. A bone biopsy was also obtained. Specimens were received by hem/onc at the time of collection. Vital signs were reviewed and were stable after the procedure.      Electronically signed by Tiffany Cosme PA-C on 4/24/2019 at 11:39 AM

## 2019-04-24 NOTE — SEDATION DOCUMENTATION
Positioned properly prone on table. Monitors and strap on. Moi FLETCHER preps and drapes area. Awaiting Dr Cecilia Dahl.

## 2019-04-25 LAB — BONE MARROW REPORT: NORMAL

## 2019-04-26 LAB — FLOW CYTOMETRY, BM: NORMAL

## 2019-05-01 LAB — CHROMOSOME STUDY: NORMAL

## 2019-10-30 ENCOUNTER — HOSPITAL ENCOUNTER (OUTPATIENT)
Age: 49
Setting detail: OUTPATIENT SURGERY
Discharge: HOME | End: 2019-10-30
Attending: SURGERY | Admitting: SURGERY
Payer: COMMERCIAL

## 2019-10-30 VITALS
SYSTOLIC BLOOD PRESSURE: 144 MMHG | DIASTOLIC BLOOD PRESSURE: 78 MMHG | OXYGEN SATURATION: 97 % | BODY MASS INDEX: 39.68 KG/M2 | HEIGHT: 72 IN | WEIGHT: 293 LBS | RESPIRATION RATE: 18 BRPM | HEART RATE: 63 BPM | TEMPERATURE: 96.8 F

## 2019-10-30 PROCEDURE — 7100000011 HC PHASE II RECOVERY - ADDTL 15 MIN: Performed by: SURGERY

## 2019-10-30 PROCEDURE — 7100000010 HC PHASE II RECOVERY - FIRST 15 MIN: Performed by: SURGERY

## 2019-10-30 PROCEDURE — 88305 TISSUE EXAM BY PATHOLOGIST: CPT

## 2019-10-30 PROCEDURE — 2500000003 HC RX 250 WO HCPCS: Performed by: SURGERY

## 2019-10-30 PROCEDURE — 3600000002 HC SURGERY LEVEL 2 BASE: Performed by: SURGERY

## 2019-10-30 PROCEDURE — 88313 SPECIAL STAINS GROUP 2: CPT

## 2019-10-30 PROCEDURE — 2709999900 HC NON-CHARGEABLE SUPPLY: Performed by: SURGERY

## 2019-10-30 RX ORDER — GLIPIZIDE 10 MG/1
10 TABLET ORAL
COMMUNITY

## 2019-10-30 RX ORDER — LIDOCAINE HYDROCHLORIDE 10 MG/ML
INJECTION, SOLUTION EPIDURAL; INFILTRATION; INTRACAUDAL; PERINEURAL PRN
Status: DISCONTINUED | OUTPATIENT
Start: 2019-10-30 | End: 2019-10-30 | Stop reason: ALTCHOICE

## 2019-10-30 ASSESSMENT — PAIN SCALES - GENERAL: PAINLEVEL_OUTOF10: 0

## 2019-10-30 ASSESSMENT — PAIN - FUNCTIONAL ASSESSMENT: PAIN_FUNCTIONAL_ASSESSMENT: 0-10

## 2019-11-04 LAB — SURGICAL PATHOLOGY REPORT: NORMAL

## 2020-06-17 ENCOUNTER — HOSPITAL ENCOUNTER (OUTPATIENT)
Dept: MAMMOGRAPHY | Age: 50
Discharge: HOME | End: 2020-06-19
Payer: MEDICARE

## 2020-06-17 PROCEDURE — 77063 BREAST TOMOSYNTHESIS BI: CPT

## 2021-03-16 NOTE — H&P
History and Physical Service   [place] [address]    HISTORY AND PHYSICAL EXAMINATION            Date of Evaluation: 4/4/2019  Patient name:  Rosa Bennett  MRN:   [id number]  YOB: 1970  PCP:    May Hudson MD    History Obtained From:     Patient, medical records    History of Present Illness: This is Rosa Bennett a 51 y.o. female who presents today for a bone marrow biopsy by Dr. Neena Marquez. The patient has a history of monoclonal gammopathy as well as anemia. Her labs  on 2/22/19 showed Hgb level 10.6, WBC 2.60 and platelets 386. Her IgG was 1590 and Lambda 3.61. She also has a history of Lupus as well as Guillaiin-[place] diagnosed in 2003. She currently is complaining of new onset midsternal chest pain pressure like pain that is a constant 4/10 for the past 24 hours. She states she does have MVP but has never had chest pain before in the past.  She denies any other cardiac associated symptoms such as radiation, dyspnea, palpitations, light headedness or diaphoresis. Interventional radiology called regarding this problem and told to have patient go to the ED. Patient notified and she verbalizes understanding and is agreeable to go to the ED.       Past Medical History:     Past Medical History:   Diagnosis Date    Anemia     Asthma     Bone spur     feet    Bronchitis     Guillain-[place] ([facility] [address] 2003    Guillain-[place] ([facility] [address] 2003    Hiatal hernia     Leukopenia     Lupus (HCC)     Monoclonal gammopathy     MVP (mitral valve prolapse)     Plantar fasciitis of left foot     Reye's syndrome     Seasonal allergic conjunctivitis     not sure what allergic to        Past Surgical History:     Past Surgical History:   Procedure Laterality Date    FINGER SURGERY      left ring finger    KNEE ARTHROPLASTY Left     SHOULDER SURGERY Left     TOE SURGERY      right great    WISDOM TOOTH EXTRACTION          Medications Prior to Admission:     Prior to Admission Refill requested:  LORazepam (ATIVAN) 0.5 MG tablet 75 tablet 1 12/21/2020   LOV: 12/31/2020  Next appointment: NONE scheduled  Forwarded to PCP for refill determination. medications    Medication Sig Start Date End Date Taking? Authorizing Provider   Cyanocobalamin (B-12) 1000 MCG SUBL Place 1 tablet under the tongue daily 7/9/15   Torsten Brooke MD   oxyCODONE-acetaminophen [facility]) 5-325 MG TABS Take 1-2 tablets by mouth every 6 hours as needed 7/9/15   Torsten Brooke MD   famotidine (PEPCID) 40 MG TABS Take 1 tablet by mouth daily 7/9/15   Torsten Brooke MD   Multiple Vitamins-Minerals (THERAPEUTIC MULTIVITAMIN-MINERALS) tablet Take 1 tablet by mouth daily. Historical Provider, MD   Cholecalciferol (VITAMIN D) 2000 UNITS CAPS capsule Take 2,000 Units by mouth. Historical Provider, MD   Glucosamine-Chondroit-Vit C-Mn (GLUCOSAMINE CHONDR 1500 COMPLX) CAPS Take 1,500 mg by mouth. Historical Provider, MD   albuterol (PROVENTIL HFA;VENTOLIN HFA) 108 (90 BASE) MCG/ACT inhaler Inhale 2 puffs into the lungs every 6 hours as needed for Wheezing. Historical Provider, MD        Allergies:     Pcn [penicillins]; Lisinopril; and Sulfamethoxazole-trimethoprim    Social History:     Tobacco:    reports that she has never smoked. She does not have any smokeless tobacco history on file. Alcohol:      reports that she does not drink alcohol. Drug Use:  reports that she does not use drugs. Family History:     No family history on file. Review of Systems:     Positive and Negative as described in HPI. CONSTITUTIONAL:  negative for fevers, chills, sweats, fatigue, weight loss  HEENT:  negative for vision, hearing changes, runny nose, throat pain  RESPIRATORY:  negative for shortness of breath, cough, congestion, wheezing. CARDIOVASCULAR: New onset mid sternal chest pain pressure like pain that is a constant 4/10 for the past 24 hours. She states she does have MVP but has never had chest pain before in the past.  She denies any other cardiac associated symptoms such as radiation, dyspnea, palpitations, light headedness or diaphoresis.   GASTROINTESTINAL: negative for nausea, vomiting, diarrhea, constipation, change in bowel habits, abdominal pain   GENITOURINARY:  negative for difficulty of urination, burning with urination, frequency   INTEGUMENT:  negative for rash, skin lesions, easy bruising   HEMATOLOGIC/LYMPHATIC:  negative for swelling/edema   ALLERGIC/IMMUNOLOGIC:  negative for urticaria , itching  ENDOCRINE:  negative increase in drinking, increase in urination, hot or cold intolerance  MUSCULOSKELETAL:  negative joint pains, muscle aches, swelling of joints  NEUROLOGICAL:  negative for headaches, dizziness, lightheadedness, numbness, pain, tingling extremities  BEHAVIOR/PSYCH:  negative for depression, anxiety    Physical Exam:   BP (!) 151/85   Pulse 71   Temp 98.4 °F (36.9 °C) (Oral)   Resp 16   LMP 08/19/2013   SpO2 99%   Patient's last menstrual period was 08/19/2013. General Appearance:  alert, well appearing, and in no acute distress  Mental status: oriented to person, place, and time with normal affect  Head:  normocephalic, atraumatic. Eye: no icterus, redness, pupils equal and reactive, extraocular eye movements intact, conjunctiva clear  Ear: normal external ear, no discharge, hearing intact  Nose:  no drainage noted  Mouth: mucous membranes moist  Neck: supple, no carotid bruits, thyroid not palpable  Lungs: Bilateral equal air entry, clear to ausculation, no wheezing, rales or rhonchi, normal effort  Cardiovascular: normal rate, regular rhythm, no murmur audible at this time (does have a history of MVP), gallop, rub.   Abdomen: Soft, nontender, nondistended, normal bowel sounds, no hepatomegaly or splenomegaly  Neurologic: There are no new focal motor or sensory deficits, normal muscle tone and bulk, no abnormal sensation, normal speech, cranial nerves II through XII grossly intact  Skin: No gross lesions, rashes, bruising or bleeding on exposed skin area  Extremities:  peripheral pulses palpable, no pedal edema or calf pain with palpation  Psych: normal affect         Diagnosis:      Monoclonal gammopathy    SHELLI Black CNP  4/4/2019  9:09 AM

## 2021-03-19 ENCOUNTER — IMMUNIZATION (OUTPATIENT)
Dept: PRIMARY CARE CLINIC | Age: 51
End: 2021-03-19
Payer: MEDICARE

## 2021-03-19 PROCEDURE — 91300 COVID-19, PFIZER VACCINE 30MCG/0.3ML DOSE: CPT | Performed by: INTERNAL MEDICINE

## 2021-03-19 PROCEDURE — 0001A COVID-19, PFIZER VACCINE 30MCG/0.3ML DOSE: CPT | Performed by: INTERNAL MEDICINE

## 2021-04-09 ENCOUNTER — IMMUNIZATION (OUTPATIENT)
Dept: PRIMARY CARE CLINIC | Age: 51
End: 2021-04-09
Payer: MEDICARE

## 2021-04-09 PROCEDURE — 91300 COVID-19, PFIZER VACCINE 30MCG/0.3ML DOSE: CPT | Performed by: INTERNAL MEDICINE

## 2021-04-09 PROCEDURE — 0002A COVID-19, PFIZER VACCINE 30MCG/0.3ML DOSE: CPT | Performed by: INTERNAL MEDICINE

## 2021-11-24 ENCOUNTER — HOSPITAL ENCOUNTER (OUTPATIENT)
Dept: MAMMOGRAPHY | Age: 51
Discharge: HOME | End: 2021-11-26
Payer: MEDICARE

## 2021-11-24 DIAGNOSIS — Z12.31 BREAST CANCER SCREENING BY MAMMOGRAM: ICD-10-CM

## 2021-11-24 PROCEDURE — 77063 BREAST TOMOSYNTHESIS BI: CPT

## 2022-12-07 ENCOUNTER — HOSPITAL ENCOUNTER (OUTPATIENT)
Dept: MAMMOGRAPHY | Age: 52
Discharge: HOME | End: 2022-12-09
Payer: COMMERCIAL

## 2022-12-07 DIAGNOSIS — Z12.31 BREAST CANCER SCREENING BY MAMMOGRAM: ICD-10-CM

## 2022-12-07 PROCEDURE — 77063 BREAST TOMOSYNTHESIS BI: CPT

## 2023-12-22 ENCOUNTER — HOSPITAL ENCOUNTER (OUTPATIENT)
Dept: MAMMOGRAPHY | Age: 53
Discharge: HOME | End: 2023-12-24
Payer: COMMERCIAL

## 2023-12-22 VITALS — BODY MASS INDEX: 39.68 KG/M2 | HEIGHT: 72 IN | WEIGHT: 293 LBS

## 2023-12-22 DIAGNOSIS — Z12.31 VISIT FOR SCREENING MAMMOGRAM: ICD-10-CM

## 2023-12-22 PROCEDURE — 77063 BREAST TOMOSYNTHESIS BI: CPT

## 2024-04-26 ENCOUNTER — TRANSCRIBE ORDERS (OUTPATIENT)
Dept: ADMINISTRATIVE | Age: 54
End: 2024-04-26

## 2024-04-26 DIAGNOSIS — M25.561 ACUTE PAIN OF RIGHT KNEE: Primary | ICD-10-CM

## 2024-05-13 ENCOUNTER — HOSPITAL ENCOUNTER (OUTPATIENT)
Dept: MRI IMAGING | Age: 54
Discharge: HOME | End: 2024-05-15
Attending: ORTHOPAEDIC SURGERY
Payer: COMMERCIAL

## 2024-05-13 DIAGNOSIS — M25.561 ACUTE PAIN OF RIGHT KNEE: ICD-10-CM

## 2024-05-13 PROCEDURE — 73721 MRI JNT OF LWR EXTRE W/O DYE: CPT

## 2025-01-07 ENCOUNTER — HOSPITAL ENCOUNTER (OUTPATIENT)
Dept: MAMMOGRAPHY | Age: 55
Discharge: HOME | End: 2025-01-09
Payer: COMMERCIAL

## 2025-01-07 DIAGNOSIS — Z12.31 ENCOUNTER FOR SCREENING MAMMOGRAM FOR BREAST CANCER: ICD-10-CM

## 2025-01-07 PROCEDURE — 77063 BREAST TOMOSYNTHESIS BI: CPT

## 2025-03-22 ENCOUNTER — HOSPITAL ENCOUNTER (OUTPATIENT)
Dept: GENERAL RADIOLOGY | Age: 55
Discharge: HOME | End: 2025-03-24
Payer: COMMERCIAL

## 2025-03-22 ENCOUNTER — HOSPITAL ENCOUNTER (OUTPATIENT)
Age: 55
Discharge: HOME | End: 2025-03-24
Payer: COMMERCIAL

## 2025-03-22 ENCOUNTER — HOSPITAL ENCOUNTER (OUTPATIENT)
Age: 55
Discharge: HOME | End: 2025-03-22
Payer: COMMERCIAL

## 2025-03-22 DIAGNOSIS — D47.2 MONOCLONAL GAMMOPATHY, BENIGN: ICD-10-CM

## 2025-03-22 LAB
ALBUMIN PERCENT: NORMAL %
ALBUMIN SERPL-MCNC: 4.2 G/DL (ref 3.5–5.2)
ALBUMIN SERPL-MCNC: NORMAL G/DL
ALBUMIN/GLOB SERPL: 1.4 {RATIO} (ref 1–2.5)
ALP SERPL-CCNC: 133 U/L (ref 35–104)
ALPHA 2 PERCENT: NORMAL %
ALPHA1 GLOB SERPL ELPH-MCNC: NORMAL %
ALPHA1 GLOB SERPL ELPH-MCNC: NORMAL G/DL
ALPHA2 GLOB SERPL ELPH-MCNC: NORMAL G/DL
ALT SERPL-CCNC: 24 U/L (ref 10–35)
ANION GAP SERPL CALCULATED.3IONS-SCNC: 10 MMOL/L (ref 9–16)
AST SERPL-CCNC: 22 U/L (ref 10–35)
B-GLOBULIN SERPL ELPH-MCNC: NORMAL %
B-GLOBULIN SERPL ELPH-MCNC: NORMAL G/DL
BASOPHILS # BLD: 0 K/UL (ref 0–0.2)
BASOPHILS NFR BLD: 0 % (ref 0–2)
BILIRUB SERPL-MCNC: 0.2 MG/DL (ref 0–1.2)
BUN SERPL-MCNC: 13 MG/DL (ref 6–20)
CALCIUM SERPL-MCNC: 9.1 MG/DL (ref 8.6–10.4)
CHLORIDE SERPL-SCNC: 107 MMOL/L (ref 98–107)
CO2 SERPL-SCNC: 23 MMOL/L (ref 20–31)
CREAT SERPL-MCNC: 0.8 MG/DL (ref 0.6–0.9)
EOSINOPHIL # BLD: 0 K/UL (ref 0–0.4)
EOSINOPHILS RELATIVE PERCENT: 0 % (ref 1–4)
ERYTHROCYTE [DISTWIDTH] IN BLOOD BY AUTOMATED COUNT: 13.8 % (ref 11.8–14.4)
FREE KAPPA/LAMBDA RATIO: 0.47 (ref 0.22–1.74)
GAMMA GLOB SERPL ELPH-MCNC: NORMAL G/DL
GAMMA GLOBULIN %: NORMAL %
GFR, ESTIMATED: 87 ML/MIN/1.73M2
GLUCOSE SERPL-MCNC: 70 MG/DL (ref 74–99)
HCT VFR BLD AUTO: 34.5 % (ref 36.3–47.1)
HGB BLD-MCNC: 11.1 G/DL (ref 11.9–15.1)
IMM GRANULOCYTES # BLD AUTO: 0 K/UL (ref 0–0.3)
IMM GRANULOCYTES NFR BLD: 0 %
KAPPA LC FREE SER-MCNC: 20.2 MG/L
LAMBDA LC FREE SERPL-MCNC: 43.3 MG/L (ref 4.2–27.7)
LYMPHOCYTES NFR BLD: 0.78 K/UL (ref 1–4.8)
LYMPHOCYTES RELATIVE PERCENT: 30 % (ref 24–44)
M PROTEIN 2 SERPL ELPH-MCNC: NORMAL G/DL
M PROTEIN SERPL ELPH-MCNC: NORMAL G/DL
MCH RBC QN AUTO: 34.7 PG (ref 25.2–33.5)
MCHC RBC AUTO-ENTMCNC: 32.2 G/DL (ref 28.4–34.8)
MCV RBC AUTO: 107.8 FL (ref 82.6–102.9)
MONOCYTES NFR BLD: 0.26 K/UL (ref 0.1–0.8)
MONOCYTES NFR BLD: 10 % (ref 1–7)
MORPHOLOGY: ABNORMAL
NEUTROPHILS NFR BLD: 60 % (ref 36–66)
NEUTS SEG NFR BLD: 1.56 K/UL (ref 1.8–7.7)
NRBC BLD-RTO: 0 PER 100 WBC
PATHOLOGIST: NORMAL
PLATELET # BLD AUTO: 171 K/UL (ref 138–453)
PMV BLD AUTO: 9.8 FL (ref 8.1–13.5)
POTASSIUM SERPL-SCNC: 4.3 MMOL/L (ref 3.7–5.3)
PROT PATTERN SERPL ELPH-IMP: NORMAL
PROT SERPL-MCNC: 6.9 G/DL (ref 6.6–8.7)
PROT SERPL-MCNC: 7.2 G/DL (ref 6.6–8.7)
RBC # BLD AUTO: 3.2 M/UL (ref 3.95–5.11)
SODIUM SERPL-SCNC: 140 MMOL/L (ref 136–145)
TOTAL PROT. SUM,%: NORMAL %
TOTAL PROT. SUM: NORMAL G/DL
WBC OTHER # BLD: 2.6 K/UL (ref 3.5–11.3)

## 2025-03-22 PROCEDURE — 85025 COMPLETE CBC W/AUTO DIFF WBC: CPT

## 2025-03-22 PROCEDURE — 84165 PROTEIN E-PHORESIS SERUM: CPT

## 2025-03-22 PROCEDURE — 83521 IG LIGHT CHAINS FREE EACH: CPT

## 2025-03-22 PROCEDURE — 80053 COMPREHEN METABOLIC PANEL: CPT

## 2025-03-22 PROCEDURE — 77074 RADEX OSSEOUS SURVEY LMTD: CPT

## 2025-03-22 PROCEDURE — 86334 IMMUNOFIX E-PHORESIS SERUM: CPT

## 2025-03-22 PROCEDURE — 36415 COLL VENOUS BLD VENIPUNCTURE: CPT

## 2025-03-22 PROCEDURE — 84155 ASSAY OF PROTEIN SERUM: CPT

## 2025-03-25 LAB
ALBUMIN PERCENT: 54 % (ref 56–66)
ALBUMIN SERPL-MCNC: 3.7 G/DL (ref 3.2–5.2)
ALPHA 2 PERCENT: 11 % (ref 7–12)
ALPHA1 GLOB SERPL ELPH-MCNC: 0.3 G/DL (ref 0.1–0.4)
ALPHA1 GLOB SERPL ELPH-MCNC: 4 % (ref 3–5)
ALPHA2 GLOB SERPL ELPH-MCNC: 0.8 G/DL (ref 0.5–0.9)
B-GLOBULIN SERPL ELPH-MCNC: 0.8 G/DL (ref 0.7–1.4)
B-GLOBULIN SERPL ELPH-MCNC: 12 % (ref 8–13)
GAMMA GLOB SERPL ELPH-MCNC: 1.3 G/DL (ref 0.5–1.5)
GAMMA GLOBULIN %: 19 % (ref 11–19)
ITYP INTERPRETATION: NORMAL
PATH REV: NORMAL
PATHOLOGIST: ABNORMAL
PROT PATTERN SERPL ELPH-IMP: ABNORMAL
PROT SERPL-MCNC: 6.9 G/DL (ref 6.6–8.7)
TOTAL PROT. SUM,%: 100 % (ref 98–102)
TOTAL PROT. SUM: 6.9 G/DL (ref 6.3–8.2)

## (undated) DEVICE — 3M™ STERI-STRIP™ COMPOUND BENZOIN TINCTURE 40 BAGS/CARTON 4 CARTONS/CASE C1544: Brand: 3M™ STERI-STRIP™

## (undated) DEVICE — GAUZE,SPONGE,4"X4",16PLY,XRAY,STRL,LF: Brand: MEDLINE

## (undated) DEVICE — STRIP,CLOSURE,WOUND,MEDI-STRIP,1/2X4: Brand: MEDLINE

## (undated) DEVICE — TOWEL,OR,DSP,ST,BLUE,DLX,XR,4/PK,20PK/CS: Brand: MEDLINE

## (undated) DEVICE — GLOVE SURG SZ 75 L12IN FNGR THK87MIL WHT LTX FREE

## (undated) DEVICE — BAG SPECIMEN BIOHAZARD 6IN X 9IN

## (undated) DEVICE — GLOVE SURG SZ 8 L12IN FNGR THK87MIL WHT LTX FREE

## (undated) DEVICE — INTENDED FOR TISSUE SEPARATION, AND OTHER PROCEDURES THAT REQUIRE A SHARP SURGICAL BLADE TO PUNCTURE OR CUT.: Brand: BARD-PARKER ® CARBON RIB-BACK BLADES